# Patient Record
Sex: MALE | Race: WHITE
[De-identification: names, ages, dates, MRNs, and addresses within clinical notes are randomized per-mention and may not be internally consistent; named-entity substitution may affect disease eponyms.]

---

## 2020-05-06 ENCOUNTER — HOSPITAL ENCOUNTER (OUTPATIENT)
Dept: HOSPITAL 33 - SDC-PAIN | Age: 50
Discharge: HOME | End: 2020-05-06
Attending: PSYCHIATRY & NEUROLOGY
Payer: COMMERCIAL

## 2020-05-06 DIAGNOSIS — F41.8: ICD-10-CM

## 2020-05-06 DIAGNOSIS — M54.16: Primary | ICD-10-CM

## 2020-05-06 DIAGNOSIS — K21.9: ICD-10-CM

## 2020-05-06 DIAGNOSIS — Z79.899: ICD-10-CM

## 2020-05-06 PROCEDURE — 77003 FLUOROGUIDE FOR SPINE INJECT: CPT

## 2020-05-06 PROCEDURE — 62323 NJX INTERLAMINAR LMBR/SAC: CPT

## 2020-05-06 PROCEDURE — 72100 X-RAY EXAM L-S SPINE 2/3 VWS: CPT

## 2020-05-06 NOTE — XRAY
Indication: Lumbar HELIO.



Intraoperative fluoroscopy was provided for 11 seconds.  2 digital spot images

submitted for interpretation demonstrates midline posterior needle tip

projecting just posterior to the L4-L5 interspace.  Small amount of contrast

injected for needle tip placement.  Correlate with intraoperative

findings/report.

## 2021-01-13 ENCOUNTER — HOSPITAL ENCOUNTER (OUTPATIENT)
Dept: HOSPITAL 33 - SDC-PAIN | Age: 51
Discharge: HOME | End: 2021-01-13
Attending: PSYCHIATRY & NEUROLOGY
Payer: COMMERCIAL

## 2021-01-13 DIAGNOSIS — M47.816: Primary | ICD-10-CM

## 2021-01-13 DIAGNOSIS — Z79.899: ICD-10-CM

## 2021-01-13 PROCEDURE — 77003 FLUOROGUIDE FOR SPINE INJECT: CPT

## 2021-01-13 PROCEDURE — 72100 X-RAY EXAM L-S SPINE 2/3 VWS: CPT

## 2021-01-13 NOTE — XRAY
Indication: right L4-S1 transforaminal HELIO.



Intraoperative fluoroscopy was provided for 39 seconds.  4 digital spot images

submitted for interpretation demonstrates posterior needle tips projecting

over the expected right L4 and L5 nerve roots.  Small amount of contrast

injected for needle tip placement.  Correlate with intraoperative

findings/report.